# Patient Record
Sex: MALE | Race: WHITE | NOT HISPANIC OR LATINO | URBAN - METROPOLITAN AREA
[De-identification: names, ages, dates, MRNs, and addresses within clinical notes are randomized per-mention and may not be internally consistent; named-entity substitution may affect disease eponyms.]

---

## 2019-07-09 ENCOUNTER — EMERGENCY (EMERGENCY)
Facility: HOSPITAL | Age: 59
LOS: 1 days | Discharge: HOME | End: 2019-07-09
Attending: EMERGENCY MEDICINE | Admitting: EMERGENCY MEDICINE
Payer: COMMERCIAL

## 2019-07-09 VITALS
HEART RATE: 91 BPM | RESPIRATION RATE: 18 BRPM | OXYGEN SATURATION: 99 % | SYSTOLIC BLOOD PRESSURE: 100 MMHG | TEMPERATURE: 98 F | DIASTOLIC BLOOD PRESSURE: 62 MMHG

## 2019-07-09 DIAGNOSIS — F41.9 ANXIETY DISORDER, UNSPECIFIED: ICD-10-CM

## 2019-07-09 PROCEDURE — 93010 ELECTROCARDIOGRAM REPORT: CPT | Mod: NC

## 2019-07-09 PROCEDURE — 99284 EMERGENCY DEPT VISIT MOD MDM: CPT

## 2019-07-09 NOTE — ED ADULT NURSE NOTE - OBJECTIVE STATEMENT
Patient to ED sent in from his  work place after having a panic attack. Patient states he has felt very stressed lately and has had intermittent bouts of anxiety because he has a demented mother who is living with him and he is dealing with his own health issues.

## 2019-07-09 NOTE — ED ADULT TRIAGE NOTE - CHIEF COMPLAINT QUOTE
Pt w/ PMH of liver CA and DM presents to ED today c/o one episode of "feeling overwhelmed".  Pt states he was breathing fast, but denies CP or LOC.  Pt VSS.  Pt denies SI/HI.

## 2019-07-09 NOTE — ED PROVIDER NOTE - OBJECTIVE STATEMENT
59 yo M, h/o active Stage 1 liver CA, treatment at [facility] [place], [place], on wellbutrin for smoking cessation, sent in from his  work place after having a panic attack. Patient states he has felt very stressed lately and has had intermittent bouts of anxiety because he has a demented mother who is living with him and he is dealing with his own health issues. States he began thinking about his mother earlier today, then felt panicky, felt his heart race, felt SOB, and felt very nervous. Lasted a few minutes. Symptoms have since resolved. 57 yo M, h/o active Stage 1 liver CA, [place] [facility] [place] , on wellbutrin for smoking cessation, sent in from his  work place after having a panic attack. Patient states he has felt very stressed lately and has had intermittent bouts of anxiety because he has a demented mother who is living with him and he is dealing with his own health issues. States he began thinking about his mother earlier today, then felt panicky, felt his heart race, felt SOB, and felt very nervous. Lasted a few minutes. Symptoms have since resolved. Denies suicidal or homicidal ideations. Denies chest pain, shortness of breath, nausea, vomiting, abdominal pain, weakness, numbness, tingling, confusion.

## 2019-07-09 NOTE — ED PROVIDER NOTE - CLINICAL SUMMARY MEDICAL DECISION MAKING FREE TEXT BOX
asd Patient well-appearing male   With active cancer and a demented mother that he is living with in trying care for, who was sent in from work after having the patient believes is a panic attack.  Patient reports he has had a lot of anxiety recently.  He denies suicidal ideation.  He has good insight into his anxiety.  Patient knows that he is going to speak to a therapist.  I have discussed resources this patient's and he states he is going to contact the physicians at [facility] [place], where he follows for his cancer, as he has been told that he has this resource seeking support for mental health issues through them.

## 2019-07-09 NOTE — ED PROVIDER NOTE - NSFOLLOWUPINSTRUCTIONS_ED_ALL_ED_FT
F/u with [facility] for referral for mental health care  Consider bringing mother to a geriatrician as discussed

## 2019-07-10 PROBLEM — Z00.00 ENCOUNTER FOR PREVENTIVE HEALTH EXAMINATION: Status: ACTIVE | Noted: 2019-07-10

## 2021-01-18 ENCOUNTER — APPOINTMENT (OUTPATIENT)
Dept: SURGERY | Facility: CLINIC | Age: 61
End: 2021-01-18
Payer: COMMERCIAL

## 2021-01-18 PROCEDURE — 99072 ADDL SUPL MATRL&STAF TM PHE: CPT

## 2021-01-18 PROCEDURE — 99203 OFFICE O/P NEW LOW 30 MIN: CPT
